# Patient Record
Sex: FEMALE | Race: WHITE | NOT HISPANIC OR LATINO | Employment: UNEMPLOYED | URBAN - METROPOLITAN AREA
[De-identification: names, ages, dates, MRNs, and addresses within clinical notes are randomized per-mention and may not be internally consistent; named-entity substitution may affect disease eponyms.]

---

## 2021-12-15 ENCOUNTER — HOSPITAL ENCOUNTER (EMERGENCY)
Facility: CLINIC | Age: 51
Discharge: HOME | End: 2021-12-15
Admitting: EMERGENCY MEDICINE
Payer: OTHER GOVERNMENT

## 2021-12-15 VITALS
SYSTOLIC BLOOD PRESSURE: 155 MMHG | TEMPERATURE: 99 F | WEIGHT: 220 LBS | OXYGEN SATURATION: 100 % | RESPIRATION RATE: 18 BRPM | HEART RATE: 83 BPM | DIASTOLIC BLOOD PRESSURE: 77 MMHG

## 2021-12-15 DIAGNOSIS — J10.1 INFLUENZA A: ICD-10-CM

## 2021-12-15 LAB
FLUAV RNA SPEC QL NAA+PROBE: POSITIVE
FLUBV RNA RESP QL NAA+PROBE: NEGATIVE
SARS-COV-2 RNA RESP QL NAA+PROBE: NEGATIVE

## 2021-12-15 PROCEDURE — C9803 HOPD COVID-19 SPEC COLLECT: HCPCS

## 2021-12-15 PROCEDURE — 87636 SARSCOV2 & INF A&B AMP PRB: CPT | Performed by: EMERGENCY MEDICINE

## 2021-12-15 PROCEDURE — 99283 EMERGENCY DEPT VISIT LOW MDM: CPT

## 2021-12-15 NOTE — DISCHARGE INSTRUCTIONS
You were seen in the emergency department today for evaluation of fever.  Your swab was positive for influenza A which is likely the cause of your symptoms.    You may take Tylenol and ibuprofen for pain/fever, do not exceed 4000 mg of Tylenol per day or 3200 mg ofibuprofen per day.    Stay home until you are fever free for 24 hours with no Tylenol or ibuprofen.  Follow-up with your primary care provider next week for recheck.  Return to the ER if you develop any new or worsening symptoms like difficulty breathing, coughing up blood, passing out, chest pain, fever despite Tylenol and ibuprofen, or any other symptoms that concern you.

## 2021-12-15 NOTE — ED TRIAGE NOTES
Patient is here with hot flashes, sweating, anxiety, nasal congestion, cough, loss of appetite. She did have one episode of diarrhea

## 2021-12-15 NOTE — ED PROVIDER NOTES
EMERGENCY DEPARTMENT ENCOUNTER      NAME: Farida Calzada  AGE: 51 year old female  YOB: 1970  MRN: [id number]  EVALUATION DATE & TIME: 12/15/2021  1:56 PM    PCP: No primary care provider on file.    ED PROVIDER: Sommer Stephens PA-C      Chief Complaint   Patient presents with     Anxiety     Nausea         FINAL IMPRESSION:  1. Influenza A          ED COURSE & MEDICAL DECISION MAKING:    Pertinent Labs & Imaging studies reviewed. (See chart for details)    51 year old female presents to the Emergency Department for evaluation of fever and cough.    Physical exam is remarkable for an ill but nontoxic-appearing female.  Heart and lung sounds generally unremarkable, harsh cough noted.  Oropharynx unremarkable.  Abdomen soft and nontender.  Vital signs are stable and she is afebrile.    Covid/influenza swab is positive for influenza A.    I do not think any further emergent labs or imaging are indicated at this time.  The patient is hemodynamically stable and does not appear in respiratory distress.  She denies any chest pain or shortness of breath.  She is tolerating p.o. intake at home.  Suspect her symptoms are secondary to influenza, unfortunately too much time has elapsed since onset for Tamiflu.  Recommend Tylenol and ibuprofen at home for symptom control.  Advised follow-up with her primary care provider for recheck.  Recommend return to the ED with any new or worsening symptoms.  The patient is agreeable with this treatment plan and verbalized her understanding.    ED Course   2:04 PM Katharine, the PA student, met and evaluated the patient.  2:10 PM Performed my initial history and physical exam. Discussed workup in the emergency department, management of symptoms, and likely disposition. I discussed the plan for discharge with the patient, and patient is agreeable. We discussed supportive cares at home and reasons for return to the ER including new or worsening symptoms - all questions and  concerns addressed. Patient to be discharged by RN.    At the conclusion of the encounter I discussed the results of all of the tests and the disposition. The questions were answered. The patient or family acknowledged understanding and was agreeable with the care plan.     Voice recognition software was used in the creation of this note. Any grammatical or nonsensical errors are due to inherent errors with the software and are not the intention of the writer.     MEDICATIONS GIVEN IN THE EMERGENCY:  Medications - No data to display    NEW PRESCRIPTIONS STARTED AT TODAY'S ER VISIT  There are no discharge medications for this patient.           =================================================================    HPI    Patient information was obtained from: Patient    Use of Intrepreter: N/A       Farida Calzada is a 51 year old female with a history of SHAMEKA who presents to the ED for evaluation of fever and chills.    The patient reports hot flashes, chills, nausea, and decreased appetite since Sunday (12/12). She had 1 episode of diarrhea on Sunday. She has also had some congestion. Her school age son has recently been sick with similar symptoms. She also reports feeling anxious and under stress about her living situation. She denies any chest pain, shortness of breath, sore throat, abdominal pain, or vomiting. She has been treating her symptoms with Tylenol and ibuprofen. She denies any other complaints at this time.      REVIEW OF SYSTEMS   Constitutional:  No reported weight loss, weakness. Endorses fever, chills, loss of appetite.  HENT:  No reported sore throat, ear pain, dysphagia. Endorses congestion.  Respiratory: Reports cough; No reported SOB, hemoptysis  Cardiovascular:  No reported CP, HURTADO, palpitations, syncope  GI:  No reported abdominal pain, vomiting, hematemesis, dark or bloody stools, hematochezia. Endorses diarrhea (1x - resolved), nausea.  Musculoskeletal:  No reported new muscle/joint pain,  swelling or loss of function.   Neurologic:  No reported headache, focal weakness  Psychiatric: Endorses anxiety.     All other systems reviewed and are negative unless noted in HPI.      PAST MEDICAL HISTORY:  History reviewed. No pertinent past medical history.    PAST SURGICAL HISTORY:  History reviewed. No pertinent surgical history.    CURRENT MEDICATIONS:    No current outpatient medications on file.      ALLERGIES:  No Known Allergies    FAMILY HISTORY:  History reviewed. No pertinent family history.    SOCIAL HISTORY:   Social History     Socioeconomic History     Marital status: Single     Spouse name: Not on file     Number of children: Not on file     Years of education: Not on file     Highest education level: Not on file   Occupational History     Not on file   Tobacco Use     Smoking status: Not on file     Smokeless tobacco: Not on file   Substance and Sexual Activity     Alcohol use: Not on file     Drug use: Not on file     Sexual activity: Not on file   Other Topics Concern     Not on file   Social History Narrative     Not on file     Social Determinants of Health     Financial Resource Strain: Not on file   Food Insecurity: Not on file   Transportation Needs: Not on file   Physical Activity: Not on file   Stress: Not on file   Social Connections: Not on file   Intimate Partner Violence: Not on file   Housing Stability: Not on file       VITALS:  Patient Vitals for the past 24 hrs:   BP Temp Temp src Pulse Resp SpO2 Weight   12/15/21 1430 (!) 155/77 -- -- 83 18 -- --   12/15/21 0948 (!) 156/97 99  F (37.2  C) Oral 101 18 100 % 99.8 kg (220 lb)       PHYSICAL EXAM    VITAL SIGNS: BP (!) 155/77   Pulse 83   Temp 99  F (37.2  C) (Oral)   Resp 18   Wt 99.8 kg (220 lb)   LMP 12/15/2021   SpO2 100%   General Appearance: Ill but nontoxic-appearing; alert, cooperative, normal speech and facial symmetry, appears stated age, the patient does not appear in distress  Head:  Normocephalic, without  obvious abnormality, atraumatic  Eyes: Conjunctiva/corneas clear, EOM's intact, no nystagmus, PERRL  ENT:  Lips, mucosa, and tongue normal; teeth and gums normal, no pharyngeal inflammation, no dysphonia or difficulty swallowing, membranes are moist without pallor  Cardio:  Regular rate and rhythm, S1 and S2 normal, no murmur, rub    or gallop, 2+ pulses symmetric in all extremities  Pulm: Harsh cough; clear to auscultation bilaterally, respirations unlabored with no accessory muscle use  Abdomen:  Abdomen is soft, non-distended with no tenderness to palpation, rebound tenderness, or guarding.   Extremities: Moves all extremities  Neuro: Patient is awake, alert, and responsive to voice. No gross motor weaknesses or sensory loss; moves all extremities.     LAB:  All pertinent labs reviewed and interpreted.  Labs Ordered and Resulted from Time of ED Arrival to Time of ED Departure   INFLUENZA A/B & SARS-COV2 PCR MULTIPLEX - Abnormal       Result Value    Influenza A PCR Positive (*)     Influenza B PCR Negative      SARS CoV2 PCR Negative         RADIOLOGY:  Reviewed all pertinent imaging. Please see official radiology report.  No orders to display         IParker, am serving as a scribe to document services personally performed by Sommer Stephens PA-C based on my observation and the provider's statements to me. ISommer PA-C attest that Parker Ladd is acting in a scribe capacity, has observed my performance of the services and has documented them in accordance with my direction.     Sommer Stephens PA-C  Emergency Medicine  [facility] EMERGENCY ROOM  [address] [facility] [place] [id number] [phone]  Dept: [phone]     Sommer Stephens PA-C  12/15/21 [id number]